# Patient Record
Sex: FEMALE | Race: WHITE | NOT HISPANIC OR LATINO | URBAN - METROPOLITAN AREA
[De-identification: names, ages, dates, MRNs, and addresses within clinical notes are randomized per-mention and may not be internally consistent; named-entity substitution may affect disease eponyms.]

---

## 2023-11-22 ENCOUNTER — OFFICE VISIT (OUTPATIENT)
Dept: URBAN - METROPOLITAN AREA CLINIC 54 | Facility: CLINIC | Age: 53
End: 2023-11-22

## 2023-11-22 RX ORDER — DULAGLUTIDE 1.5 MG/.5ML
AS DIRECTED INJECTION, SOLUTION SUBCUTANEOUS
Status: ACTIVE | COMMUNITY

## 2023-11-22 RX ORDER — DULOXETINE 60 MG/1
1 CAPSULE CAPSULE, DELAYED RELEASE PELLETS ORAL ONCE A DAY
Status: ACTIVE | COMMUNITY

## 2023-11-22 RX ORDER — BACLOFEN 10 MG/1
TAKE 1 TABLET (10 MG) BY ORAL ROUTE 4 TIMES PER DAY TABLET ORAL
Qty: 0 | Refills: 0 | Status: ACTIVE | COMMUNITY
Start: 1900-01-01

## 2023-11-22 RX ORDER — PREGABALIN 150 MG/1
1 CAPSULE CAPSULE ORAL ONCE A DAY
Status: ACTIVE | COMMUNITY

## 2023-11-22 RX ORDER — BUPROPION HYDROCHLORIDE 100 MG/1
TAKE 1 TABLET (100 MG) BY ORAL ROUTE 2 TIMES PER DAY TABLET, FILM COATED ORAL 2
Qty: 0 | Refills: 0 | Status: ACTIVE | COMMUNITY
Start: 1900-01-01

## 2023-11-22 RX ORDER — ST. JOHN'S WORT 300 MG
1 CAPSULE CAPSULE ORAL ONCE A DAY
Status: ACTIVE | COMMUNITY

## 2023-11-22 RX ORDER — ONDANSETRON 4 MG/1
1 TABLET ON THE TONGUE AND ALLOW TO DISSOLVE TABLET, ORALLY DISINTEGRATING ORAL ONCE A DAY
Status: ACTIVE | COMMUNITY

## 2023-11-22 RX ORDER — METFORMIN HYDROCHLORIDE 500 MG/1
1 TABLET WITH A MEAL TABLET, FILM COATED ORAL ONCE A DAY
Status: ACTIVE | COMMUNITY

## 2023-11-22 RX ORDER — HYDROCODONE BITARTRATE AND ACETAMINOPHEN 10; 325 MG/1; MG/1
1 TABLET AS NEEDED TABLET ORAL
Status: ACTIVE | COMMUNITY

## 2023-12-01 ENCOUNTER — LAB OUTSIDE AN ENCOUNTER (OUTPATIENT)
Dept: URBAN - METROPOLITAN AREA CLINIC 54 | Facility: CLINIC | Age: 53
End: 2023-12-01

## 2023-12-01 ENCOUNTER — OFFICE VISIT (OUTPATIENT)
Dept: URBAN - METROPOLITAN AREA CLINIC 54 | Facility: CLINIC | Age: 53
End: 2023-12-01
Payer: COMMERCIAL

## 2023-12-01 VITALS
BODY MASS INDEX: 27.83 KG/M2 | DIASTOLIC BLOOD PRESSURE: 68 MMHG | HEART RATE: 99 BPM | TEMPERATURE: 97.4 F | HEIGHT: 64 IN | SYSTOLIC BLOOD PRESSURE: 123 MMHG | WEIGHT: 163 LBS

## 2023-12-01 DIAGNOSIS — K50.813 CROHN'S DISEASE OF BOTH SMALL AND LARGE INTESTINE WITH FISTULA: ICD-10-CM

## 2023-12-01 DIAGNOSIS — Z93.2 ILEOSTOMY PRESENT: ICD-10-CM

## 2023-12-01 DIAGNOSIS — R14.0 ABDOMINAL BLOATING: ICD-10-CM

## 2023-12-01 DIAGNOSIS — R19.5 LOOSE STOOLS: ICD-10-CM

## 2023-12-01 DIAGNOSIS — R10.33 PERIUMBILICAL PAIN: ICD-10-CM

## 2023-12-01 PROBLEM — 71833008: Status: ACTIVE | Noted: 2023-12-01

## 2023-12-01 PROBLEM — 302111002: Status: ACTIVE | Noted: 2023-12-01

## 2023-12-01 PROCEDURE — 99204 OFFICE O/P NEW MOD 45 MIN: CPT

## 2023-12-01 RX ORDER — ATORVASTATIN CALCIUM 20 MG/1
1 TABLET TABLET, FILM COATED ORAL ONCE A DAY
Status: ACTIVE | COMMUNITY

## 2023-12-01 RX ORDER — PREGABALIN 150 MG/1
1 CAPSULE CAPSULE ORAL ONCE A DAY
Status: ACTIVE | COMMUNITY

## 2023-12-01 RX ORDER — METFORMIN HYDROCHLORIDE 500 MG/1
1 TABLET WITH A MEAL TABLET, FILM COATED ORAL ONCE A DAY
Status: ACTIVE | COMMUNITY

## 2023-12-01 RX ORDER — DULAGLUTIDE 1.5 MG/.5ML
AS DIRECTED INJECTION, SOLUTION SUBCUTANEOUS
Status: ACTIVE | COMMUNITY

## 2023-12-01 RX ORDER — HYDROCODONE BITARTRATE AND ACETAMINOPHEN 10; 325 MG/1; MG/1
1 TABLET AS NEEDED TABLET ORAL
Status: ACTIVE | COMMUNITY

## 2023-12-01 RX ORDER — BACLOFEN 10 MG/1
TAKE 1 TABLET (10 MG) BY ORAL ROUTE 4 TIMES PER DAY TABLET ORAL
Qty: 0 | Refills: 0 | Status: ACTIVE | COMMUNITY
Start: 1900-01-01

## 2023-12-01 RX ORDER — DICYCLOMINE HYDROCHLORIDE 20 MG/1
1 TABLET TABLET ORAL
Qty: 90 | Refills: 0 | OUTPATIENT
Start: 2023-12-01 | End: 2023-12-31

## 2023-12-01 RX ORDER — BUPROPION HYDROCHLORIDE 100 MG/1
TAKE 1 TABLET (100 MG) BY ORAL ROUTE 2 TIMES PER DAY TABLET, FILM COATED ORAL 2
Qty: 0 | Refills: 0 | Status: ACTIVE | COMMUNITY
Start: 1900-01-01

## 2023-12-01 RX ORDER — ST. JOHN'S WORT 300 MG
1 CAPSULE CAPSULE ORAL ONCE A DAY
Status: ACTIVE | COMMUNITY

## 2023-12-01 RX ORDER — CERTOLIZUMAB PEGOL 200 MG/ML
AS DIRECTED INJECTION, SOLUTION SUBCUTANEOUS
Status: ACTIVE | COMMUNITY

## 2023-12-01 RX ORDER — DULOXETINE 60 MG/1
1 CAPSULE CAPSULE, DELAYED RELEASE PELLETS ORAL ONCE A DAY
Status: ACTIVE | COMMUNITY

## 2023-12-01 NOTE — PHYSICAL EXAM GASTROINTESTINAL
Abdomen , soft, nontender, nondistended , no rebound guarding or rigidity , end ileostomy present, Liver and Spleen , no hepatomegaly present , no hepatosplenomegaly , liver nontender , spleen not palpable

## 2023-12-01 NOTE — HPI-TODAY'S VISIT:
12/1/23: Pt is a 51 yo female with a PMH of Crohn's disease previously seen by Dr. Lopez in 2016 and Dr. Jordan Lara ([facility]) in 2018, who presents to reestablish care with complaints of abdominal pain and bloating. Pt was initally diagnosed with UC at age 34, managed with Asacol, sulfasalazine, and steroids. Later found to have Crohn's involving the small bowel, as well as perianal fistulas. S/p total proctocolectomy with j-pouch followed by j-pouch construction x2 with ongoing complications; now has a perminant ileostomy. She has been on Humira, Stelara, and two months ago was switched to Cimzia by rheumatology (who is also managing AS). Today pt complains of periumbilical abdominal pain and bloating x 1 month. Pain is sharp to dull and related to bloating, which are both worse after eating. Denies any particular food triggers other than watermelon, but pt has cut back on carbs. Denies any change in stool output. No bloody, black, or tarry stools noted. Pt has lost 55 lbs intentionally through diet and exercise over the last year. Last ileoscopy was in 2016 which did not show any endoscopic or histologic evidence of active IBD. Nonobstructive KUB in 6/23. Most recent CMP was unremarkable in 9/23.

## 2023-12-05 LAB
A/G RATIO: 1.6
ADENOVIRUS F 40/41: NOT DETECTED
ALBUMIN: 4.5
ALKALINE PHOSPHATASE: 92
ALT (SGPT): 38
AST (SGOT): 40
ASTROVIRUS: NOT DETECTED
BASO (ABSOLUTE): 0.1
BASOS: 0
BILIRUBIN, TOTAL: 0.5
BUN/CREATININE RATIO: 41
BUN: 26
C DIFFICILE TOXIN A/B: NOT DETECTED
C-REACTIVE PROTEIN, QUANT: <1
CALCIUM: 9.6
CAMPYLOBACTER: NOT DETECTED
CARBON DIOXIDE, TOTAL: 23
CHLORIDE: 105
CREATININE: 0.64
CRYPTOSPORIDIUM: NOT DETECTED
CYCLOSPORA CAYETANENSIS: NOT DETECTED
E COLI O157: (no result)
EGFR: 107
ENTAMOEBA HISTOLYTICA: NOT DETECTED
ENTEROAGGREGATIVE E COLI: NOT DETECTED
ENTEROPATHOGENIC E COLI: NOT DETECTED
ENTEROTOXIGENIC E COLI: NOT DETECTED
EOS (ABSOLUTE): 0.2
EOS: 1
GIARDIA LAMBLIA: NOT DETECTED
GLOBULIN, TOTAL: 2.8
GLUCOSE: 100
HEMATOCRIT: 42.7
HEMATOLOGY COMMENTS:: (no result)
HEMOGLOBIN: 14.3
IMMATURE CELLS: (no result)
IMMATURE GRANS (ABS): 0
IMMATURE GRANULOCYTES: 0
LYMPHS (ABSOLUTE): 4.6
LYMPHS: 37
MCH: 31.2
MCHC: 33.5
MCV: 93
MONOCYTES(ABSOLUTE): 1
MONOCYTES: 8
NEUTROPHILS (ABSOLUTE): 6.6
NEUTROPHILS: 54
NOROVIRUS GI/GII: NOT DETECTED
NRBC: (no result)
PLATELETS: 331
PLESIOMONAS SHIGELLOIDES: NOT DETECTED
POTASSIUM: 4.8
PROTEIN, TOTAL: 7.3
RBC: 4.59
RDW: 13.1
ROTAVIRUS A: NOT DETECTED
SALMONELLA: NOT DETECTED
SAPOVIRUS: NOT DETECTED
SHIGA-TOXIN-PRODUCING E COLI: NOT DETECTED
SHIGELLA/ENTEROINVASIVE E COLI: NOT DETECTED
SODIUM: 141
VIBRIO CHOLERAE: NOT DETECTED
VIBRIO: NOT DETECTED
WBC: 12.4
YERSINIA ENTEROCOLITICA: NOT DETECTED

## 2023-12-28 LAB
CALPROTECTIN, FECAL: 82
REQUEST PROBLEM: (no result)

## 2024-01-12 ENCOUNTER — DASHBOARD ENCOUNTERS (OUTPATIENT)
Age: 54
End: 2024-01-12

## 2024-01-12 ENCOUNTER — OFFICE VISIT (OUTPATIENT)
Dept: URBAN - METROPOLITAN AREA CLINIC 54 | Facility: CLINIC | Age: 54
End: 2024-01-12
Payer: COMMERCIAL

## 2024-01-12 ENCOUNTER — TELEPHONE ENCOUNTER (OUTPATIENT)
Dept: URBAN - METROPOLITAN AREA CLINIC 54 | Facility: CLINIC | Age: 54
End: 2024-01-12

## 2024-01-12 VITALS
HEART RATE: 82 BPM | SYSTOLIC BLOOD PRESSURE: 117 MMHG | WEIGHT: 157 LBS | TEMPERATURE: 97.9 F | HEIGHT: 64 IN | DIASTOLIC BLOOD PRESSURE: 69 MMHG | BODY MASS INDEX: 26.8 KG/M2

## 2024-01-12 DIAGNOSIS — R14.0 ABDOMINAL BLOATING: ICD-10-CM

## 2024-01-12 DIAGNOSIS — R19.7 ACUTE DIARRHEA: ICD-10-CM

## 2024-01-12 DIAGNOSIS — K50.813 CROHN'S DISEASE OF BOTH SMALL AND LARGE INTESTINE WITH FISTULA: ICD-10-CM

## 2024-01-12 DIAGNOSIS — R10.33 PERIUMBILICAL PAIN: ICD-10-CM

## 2024-01-12 PROBLEM — 111583006: Status: ACTIVE | Noted: 2024-01-12

## 2024-01-12 PROCEDURE — 99213 OFFICE O/P EST LOW 20 MIN: CPT

## 2024-01-12 RX ORDER — ST. JOHN'S WORT 300 MG
1 CAPSULE CAPSULE ORAL ONCE A DAY
Status: ACTIVE | COMMUNITY

## 2024-01-12 RX ORDER — ATORVASTATIN CALCIUM 20 MG/1
1 TABLET TABLET, FILM COATED ORAL ONCE A DAY
Status: ACTIVE | COMMUNITY

## 2024-01-12 RX ORDER — CERTOLIZUMAB PEGOL 200 MG/ML
AS DIRECTED INJECTION, SOLUTION SUBCUTANEOUS
OUTPATIENT

## 2024-01-12 RX ORDER — DULOXETINE 60 MG/1
1 CAPSULE CAPSULE, DELAYED RELEASE PELLETS ORAL ONCE A DAY
Status: ACTIVE | COMMUNITY

## 2024-01-12 RX ORDER — DULAGLUTIDE 1.5 MG/.5ML
AS DIRECTED INJECTION, SOLUTION SUBCUTANEOUS
Status: ACTIVE | COMMUNITY

## 2024-01-12 RX ORDER — CERTOLIZUMAB PEGOL 200 MG/ML
AS DIRECTED INJECTION, SOLUTION SUBCUTANEOUS
Status: ACTIVE | COMMUNITY

## 2024-01-12 RX ORDER — BACLOFEN 10 MG/1
TAKE 1 TABLET (10 MG) BY ORAL ROUTE 4 TIMES PER DAY TABLET ORAL
Qty: 0 | Refills: 0 | Status: ACTIVE | COMMUNITY
Start: 1900-01-01

## 2024-01-12 RX ORDER — METFORMIN HYDROCHLORIDE 500 MG/1
1 TABLET WITH A MEAL TABLET, FILM COATED ORAL ONCE A DAY
Status: ACTIVE | COMMUNITY

## 2024-01-12 RX ORDER — BUPROPION HYDROCHLORIDE 100 MG/1
TAKE 1 TABLET (100 MG) BY ORAL ROUTE 2 TIMES PER DAY TABLET, FILM COATED ORAL 2
Qty: 0 | Refills: 0 | Status: ACTIVE | COMMUNITY
Start: 1900-01-01

## 2024-01-12 RX ORDER — PREGABALIN 150 MG/1
1 CAPSULE CAPSULE ORAL ONCE A DAY
Status: ACTIVE | COMMUNITY

## 2024-01-12 RX ORDER — HYDROCODONE BITARTRATE AND ACETAMINOPHEN 10; 325 MG/1; MG/1
1 TABLET AS NEEDED TABLET ORAL
Status: ACTIVE | COMMUNITY

## 2024-01-12 NOTE — HPI-TODAY'S VISIT:
12/1/23: Pt is a 51 yo female with a PMH of Crohn's disease previously seen by Dr. Lopez in 2016 and Dr. Jordan Lara ([facility]) in 2018, who presents to reestablish care with complaints of abdominal pain and bloating. Pt was initally diagnosed with UC at age 34, managed with Asacol, sulfasalazine, and steroids. Later found to have Crohn's involving the small bowel, as well as perianal fistulas. S/p total proctocolectomy with j-pouch followed by j-pouch construction x2 with ongoing complications; now has a perminant ileostomy. She has been on Humira, Stelara, and two months ago was switched to Cimzia by rheumatology (who is also managing AS). Today pt complains of periumbilical abdominal pain and bloating x 1 month. Pain is sharp to dull and related to bloating, which are both worse after eating. Denies any particular food triggers other than watermelon, but pt has cut back on carbs. Denies any change in stool output. No bloody, black, or tarry stools noted. Pt has lost 55 lbs intentionally through diet and exercise over the last year. Last ileoscopy was in 2016 which did not show any endoscopic or histologic evidence of active IBD. Nonobstructive KUB in 6/23. Most recent CMP was unremarkable in 9/23.  1/12/24: Pt presents for follow up of abd pain and bloating. Labs showed stable leukocytosis around 12 - pt has upcoming consult with heme for this. Slightly elevated but stable transaminases (AST 40, ALT 38). Normal CRP, borderline FC at 82. Negative infectious stool panel. MRE showed no evidence of active Crohn's. Abd pain and bloating have resolved with dietary changes following low FODMAP diet. Pt thinks she was eating too much watermelon. Initially had to take bentyl TID but not needing it at all anymore. Pt is concerned that her stoma is shrunk and is narrow. She was told that on last few ileoscopies. Still producing normal output. No severe cramping followed by explosive stool.

## 2024-01-12 NOTE — PHYSICAL EXAM GASTROINTESTINAL
Abdomen , soft, nontender, nondistended , no rebound guarding or rigidity , end ileostomy present with healthy appearing stoma, slightly retracted, Liver and Spleen , no hepatomegaly present , no hepatosplenomegaly , liver nontender , spleen not palpable

## 2024-07-12 ENCOUNTER — OFFICE VISIT (OUTPATIENT)
Dept: URBAN - METROPOLITAN AREA CLINIC 54 | Facility: CLINIC | Age: 54
End: 2024-07-12

## 2024-07-12 RX ORDER — CERTOLIZUMAB PEGOL 200 MG/ML
AS DIRECTED INJECTION, SOLUTION SUBCUTANEOUS
Status: ACTIVE | COMMUNITY

## 2024-07-12 RX ORDER — CERTOLIZUMAB PEGOL 200 MG/ML
AS DIRECTED INJECTION, SOLUTION SUBCUTANEOUS
OUTPATIENT

## 2024-07-12 RX ORDER — BACLOFEN 10 MG/1
TAKE 1 TABLET (10 MG) BY ORAL ROUTE 4 TIMES PER DAY TABLET ORAL
Qty: 0 | Refills: 0 | Status: ACTIVE | COMMUNITY
Start: 1900-01-01

## 2024-07-12 RX ORDER — DULAGLUTIDE 1.5 MG/.5ML
AS DIRECTED INJECTION, SOLUTION SUBCUTANEOUS
Status: ACTIVE | COMMUNITY

## 2024-07-12 RX ORDER — METHOCARBAMOL 750 MG/1
1 TABLET TABLET ORAL
Status: ACTIVE | COMMUNITY

## 2024-07-12 RX ORDER — DICYCLOMINE HYDROCHLORIDE 20 MG/1
1 TABLET TABLET ORAL
Qty: 90 | Refills: 3 | Status: ACTIVE | COMMUNITY
Start: 2023-12-01 | End: 2024-08-22

## 2024-07-12 RX ORDER — HYDROCODONE BITARTRATE AND ACETAMINOPHEN 10; 325 MG/1; MG/1
1 TABLET AS NEEDED TABLET ORAL
Status: ACTIVE | COMMUNITY

## 2024-07-12 RX ORDER — ONDANSETRON 4 MG/1
1 TABLET ON THE TONGUE AND ALLOW TO DISSOLVE TABLET, ORALLY DISINTEGRATING ORAL ONCE A DAY
Status: ACTIVE | COMMUNITY

## 2024-07-12 RX ORDER — PREGABALIN 150 MG/1
1 CAPSULE CAPSULE ORAL ONCE A DAY
Status: ACTIVE | COMMUNITY

## 2024-07-12 RX ORDER — DULOXETINE 60 MG/1
1 CAPSULE CAPSULE, DELAYED RELEASE PELLETS ORAL ONCE A DAY
Status: ACTIVE | COMMUNITY

## 2024-07-12 RX ORDER — BUPROPION HYDROCHLORIDE 100 MG/1
TAKE 1 TABLET (100 MG) BY ORAL ROUTE 2 TIMES PER DAY TABLET, FILM COATED ORAL 2
Qty: 0 | Refills: 0 | Status: ACTIVE | COMMUNITY
Start: 1900-01-01

## 2024-07-12 RX ORDER — METFORMIN HYDROCHLORIDE 500 MG/1
1 TABLET WITH A MEAL TABLET, FILM COATED ORAL ONCE A DAY
Status: ACTIVE | COMMUNITY

## 2024-07-12 RX ORDER — ST. JOHN'S WORT 300 MG
1 CAPSULE CAPSULE ORAL ONCE A DAY
Status: ACTIVE | COMMUNITY

## 2024-07-12 NOTE — HPI-TODAY'S VISIT:
12/1/23: Pt is a 53 yo female with a PMH of Crohn's disease previously seen by Dr. Lopez in 2016 and Dr. Jordan Lara ([facility]) in 2018, who presents to reestablish care with complaints of abdominal pain and bloating. Pt was initally diagnosed with UC at age 34, managed with Asacol, sulfasalazine, and steroids. Later found to have Crohn's involving the small bowel, as well as perianal fistulas. S/p total proctocolectomy with j-pouch followed by j-pouch construction x2 with ongoing complications; now has a perminant ileostomy. She has been on Humira, Stelara, and two months ago was switched to Cimzia by rheumatology (who is also managing AS). Today pt complains of periumbilical abdominal pain and bloating x 1 month. Pain is sharp to dull and related to bloating, which are both worse after eating. Denies any particular food triggers other than watermelon, but pt has cut back on carbs. Denies any change in stool output. No bloody, black, or tarry stools noted. Pt has lost 55 lbs intentionally through diet and exercise over the last year. Last ileoscopy was in 2016 which did not show any endoscopic or histologic evidence of active IBD. Nonobstructive KUB in 6/23. Most recent CMP was unremarkable in 9/23.  1/12/24: Pt presents for follow up of abd pain and bloating. Labs showed stable leukocytosis around 12 - pt has upcoming consult with heme for this. Slightly elevated but stable transaminases (AST 40, ALT 38). Normal CRP, borderline FC at 82. Negative infectious stool panel. MRE showed no evidence of active Crohn's. Abd pain and bloating have resolved with dietary changes following low FODMAP diet. Pt thinks she was eating too much watermelon. Initially had to take bentyl TID but not needing it at all anymore. Pt is concerned that her stoma is shrunk and is narrow. She was told that on last few ileoscopies. Still producing normal output. No severe cramping followed by explosive stool.

## 2024-07-26 ENCOUNTER — OFFICE VISIT (OUTPATIENT)
Dept: URBAN - METROPOLITAN AREA CLINIC 54 | Facility: CLINIC | Age: 54
End: 2024-07-26

## 2024-07-26 RX ORDER — HYDROCODONE BITARTRATE AND ACETAMINOPHEN 10; 325 MG/1; MG/1
1 TABLET AS NEEDED TABLET ORAL
Status: ACTIVE | COMMUNITY

## 2024-07-26 RX ORDER — METFORMIN HYDROCHLORIDE 500 MG/1
1 TABLET WITH A MEAL TABLET, FILM COATED ORAL ONCE A DAY
Status: ACTIVE | COMMUNITY

## 2024-07-26 RX ORDER — ONDANSETRON 4 MG/1
1 TABLET ON THE TONGUE AND ALLOW TO DISSOLVE TABLET, ORALLY DISINTEGRATING ORAL ONCE A DAY
Status: ACTIVE | COMMUNITY

## 2024-07-26 RX ORDER — METHOCARBAMOL 750 MG/1
1 TABLET TABLET ORAL
Status: ACTIVE | COMMUNITY

## 2024-07-26 RX ORDER — CERTOLIZUMAB PEGOL 200 MG/ML
AS DIRECTED INJECTION, SOLUTION SUBCUTANEOUS
Status: ACTIVE | COMMUNITY

## 2024-07-26 RX ORDER — BACLOFEN 10 MG/1
TAKE 1 TABLET (10 MG) BY ORAL ROUTE 4 TIMES PER DAY TABLET ORAL
Qty: 0 | Refills: 0 | Status: ACTIVE | COMMUNITY
Start: 1900-01-01

## 2024-07-26 RX ORDER — BUPROPION HYDROCHLORIDE 100 MG/1
TAKE 1 TABLET (100 MG) BY ORAL ROUTE 2 TIMES PER DAY TABLET, FILM COATED ORAL 2
Qty: 0 | Refills: 0 | Status: ACTIVE | COMMUNITY
Start: 1900-01-01

## 2024-07-26 RX ORDER — DULOXETINE 60 MG/1
1 CAPSULE CAPSULE, DELAYED RELEASE PELLETS ORAL ONCE A DAY
Status: ACTIVE | COMMUNITY

## 2024-07-26 RX ORDER — ST. JOHN'S WORT 300 MG
1 CAPSULE CAPSULE ORAL ONCE A DAY
Status: ACTIVE | COMMUNITY

## 2024-07-26 RX ORDER — DULAGLUTIDE 1.5 MG/.5ML
AS DIRECTED INJECTION, SOLUTION SUBCUTANEOUS
Status: ACTIVE | COMMUNITY

## 2024-07-26 RX ORDER — DICYCLOMINE HYDROCHLORIDE 20 MG/1
1 TABLET TABLET ORAL
Qty: 90 | Refills: 3 | Status: ACTIVE | COMMUNITY
Start: 2023-12-01 | End: 2024-08-22

## 2024-07-26 RX ORDER — PREGABALIN 150 MG/1
1 CAPSULE CAPSULE ORAL ONCE A DAY
Status: ACTIVE | COMMUNITY

## 2024-07-26 RX ORDER — ATORVASTATIN CALCIUM 20 MG/1
1 TABLET TABLET, FILM COATED ORAL ONCE A DAY
Status: ACTIVE | COMMUNITY

## 2025-03-11 ENCOUNTER — OFFICE VISIT (OUTPATIENT)
Dept: URBAN - METROPOLITAN AREA CLINIC 54 | Facility: CLINIC | Age: 55
End: 2025-03-11
Payer: COMMERCIAL

## 2025-03-11 VITALS
HEIGHT: 64 IN | DIASTOLIC BLOOD PRESSURE: 66 MMHG | SYSTOLIC BLOOD PRESSURE: 110 MMHG | BODY MASS INDEX: 28.9 KG/M2 | HEART RATE: 84 BPM | WEIGHT: 169.3 LBS | TEMPERATURE: 97.6 F

## 2025-03-11 DIAGNOSIS — R19.5 LOOSE STOOLS: ICD-10-CM

## 2025-03-11 DIAGNOSIS — D72.829 LEUKOCYTOSIS, UNSPECIFIED TYPE: ICD-10-CM

## 2025-03-11 DIAGNOSIS — Z93.2 ILEOSTOMY PRESENT: ICD-10-CM

## 2025-03-11 DIAGNOSIS — K50.813 CROHN'S DISEASE OF BOTH SMALL AND LARGE INTESTINE WITH FISTULA: ICD-10-CM

## 2025-03-11 DIAGNOSIS — R10.33 PERIUMBILICAL PAIN: ICD-10-CM

## 2025-03-11 DIAGNOSIS — R14.0 ABDOMINAL BLOATING: ICD-10-CM

## 2025-03-11 PROCEDURE — 99214 OFFICE O/P EST MOD 30 MIN: CPT | Performed by: PHYSICIAN ASSISTANT

## 2025-03-11 RX ORDER — ATORVASTATIN CALCIUM 20 MG/1
1 TABLET TABLET, FILM COATED ORAL ONCE A DAY
Status: ACTIVE | COMMUNITY

## 2025-03-11 RX ORDER — ST. JOHN'S WORT 300 MG
1 CAPSULE CAPSULE ORAL ONCE A DAY
Status: ACTIVE | COMMUNITY

## 2025-03-11 RX ORDER — DULOXETINE 60 MG/1
1 CAPSULE CAPSULE, DELAYED RELEASE PELLETS ORAL ONCE A DAY
Status: ACTIVE | COMMUNITY

## 2025-03-11 RX ORDER — CERTOLIZUMAB PEGOL 200 MG/ML
AS DIRECTED INJECTION, SOLUTION SUBCUTANEOUS
Status: ACTIVE | COMMUNITY

## 2025-03-11 RX ORDER — METFORMIN HYDROCHLORIDE 500 MG/1
1 TABLET WITH A MEAL TABLET, FILM COATED ORAL ONCE A DAY
Status: ACTIVE | COMMUNITY

## 2025-03-11 RX ORDER — BACLOFEN 10 MG/1
TAKE 1 TABLET (10 MG) BY ORAL ROUTE 4 TIMES PER DAY TABLET ORAL
Qty: 0 | Refills: 0 | Status: ACTIVE | COMMUNITY
Start: 1900-01-01

## 2025-03-11 RX ORDER — PREGABALIN 150 MG/1
1 CAPSULE CAPSULE ORAL ONCE A DAY
Status: ACTIVE | COMMUNITY

## 2025-03-11 RX ORDER — BUPROPION HYDROCHLORIDE 100 MG/1
TAKE 1 TABLET (100 MG) BY ORAL ROUTE 2 TIMES PER DAY TABLET, FILM COATED ORAL 2
Qty: 0 | Refills: 0 | Status: ACTIVE | COMMUNITY
Start: 1900-01-01

## 2025-03-11 RX ORDER — ONDANSETRON 4 MG/1
1 TABLET ON THE TONGUE AND ALLOW TO DISSOLVE TABLET, ORALLY DISINTEGRATING ORAL ONCE A DAY
Status: ACTIVE | COMMUNITY

## 2025-03-11 RX ORDER — HYDROCODONE BITARTRATE AND ACETAMINOPHEN 10; 325 MG/1; MG/1
1 TABLET AS NEEDED TABLET ORAL
Status: ACTIVE | COMMUNITY

## 2025-03-11 RX ORDER — DULAGLUTIDE 1.5 MG/.5ML
AS DIRECTED INJECTION, SOLUTION SUBCUTANEOUS
Status: ACTIVE | COMMUNITY

## 2025-03-11 RX ORDER — METHOCARBAMOL 750 MG/1
1 TABLET TABLET ORAL
Status: ACTIVE | COMMUNITY

## 2025-03-11 NOTE — PHYSICAL EXAM GASTROINTESTINAL
Abdomen , soft, nontender, nondistended , no guarding or rigidity , no masses palpable , stoma palpated over her ostomy bag w/o tenderness. no severe herniation. excessive abdominal fat.

## 2025-03-11 NOTE — HPI-TODAY'S VISIT:
12/1/23: Pt is a 51 yo female with a PMH of Crohn's disease previously seen by Dr. Lopez in 2016 and Dr. Jordan Laar ([facility]) in 2018, who presents to reestablish care with complaints of abdominal pain and bloating. Pt was initally diagnosed with UC at age 34, managed with Asacol, sulfasalazine, and steroids. Later found to have Crohn's involving the small bowel, as well as perianal fistulas. S/p total proctocolectomy with j-pouch followed by j-pouch construction x2 with ongoing complications; now has a perminant ileostomy. She has been on Humira, Stelara, and two months ago was switched to Cimzia by rheumatology (who is also managing AS). Today pt complains of periumbilical abdominal pain and bloating x 1 month. Pain is sharp to dull and related to bloating, which are both worse after eating. Denies any particular food triggers other than watermelon, but pt has cut back on carbs. Denies any change in stool output. No bloody, black, or tarry stools noted. Pt has lost 55 lbs intentionally through diet and exercise over the last year. Last ileoscopy was in 2016 which did not show any endoscopic or histologic evidence of active IBD. Nonobstructive KUB in 6/23. Most recent CMP was unremarkable in 9/23.  1/12/24: Pt presents for follow up of abd pain and bloating. Labs showed stable leukocytosis around 12 - pt has upcoming consult with heme for this. Slightly elevated but stable transaminases (AST 40, ALT 38). Normal CRP, borderline FC at 82. Negative infectious stool panel. MRE showed no evidence of active Crohn's. Abd pain and bloating have resolved with dietary changes following low FODMAP diet. Pt thinks she was eating too much watermelon. Initially had to take bentyl TID but not needing it at all anymore. Pt is concerned that her stoma is shrunk and is narrow. She was told that on last few ileoscopies. Still producing normal output. No severe cramping followed by explosive stool.  3/11/25: Patient presents with concern of Crohn's flare. Patient switched from Cimza to?Rinvoqu (unclear dosage) approx 5 months ago that seems to help with her arthritis and AS. Patient reports bloating and reducible herniation of her stoma. Pt continues to follow low FODMAP diet. She admits to drinking carbonated hahn. Recently she reports low grade fever of 100.4 - 100.8F detected last night and noted a fever blister to her mouth. Normal stool frequency and constinency. CT stone in 1/16/25 that showed bilateral renal calculi, CT A/P w/o in 11/2024with bilateral nephorlithiasis othwise no acute findings, and CT A/P w/ in 9/2024 that shows bilateral nephrolithiasis w/o acute changes. No signs of inflammation, bowel dilation, or obstruction on images. Patient reports occasional epistaxis and swallowing blood.

## 2025-03-12 LAB
A/G RATIO: 1.6
ABSOLUTE BASOPHILS: 42
ABSOLUTE EOSINOPHILS: 63
ABSOLUTE LYMPHOCYTES: 3024
ABSOLUTE MONOCYTES: 935
ABSOLUTE NEUTROPHILS: 6437
ALBUMIN: 4.1
ALKALINE PHOSPHATASE: 86
ALT (SGPT): 43
AST (SGOT): 39
BASOPHILS: 0.4
BILIRUBIN, TOTAL: 0.6
BUN/CREATININE RATIO: (no result)
BUN: 9
C-REACTIVE PROTEIN, QUANT: 34.3
CALCIUM: 9.2
CARBON DIOXIDE, TOTAL: 27
CHLORIDE: 99
CREATININE: 0.77
EGFR: 92
EOSINOPHILS: 0.6
GLOBULIN, TOTAL: 2.6
GLUCOSE: 106
HEMATOCRIT: 35
HEMOGLOBIN: 11.6
LYMPHOCYTES: 28.8
MCH: 32.4
MCHC: 33.1
MCV: 97.8
MONOCYTES: 8.9
MPV: 10.8
NEUTROPHILS: 61.3
PLATELET COUNT: 331
POTASSIUM: 4.4
PROTEIN, TOTAL: 6.7
RDW: 12.2
RED BLOOD CELL COUNT: 3.58
SODIUM: 136
WHITE BLOOD CELL COUNT: 10.5

## 2025-03-21 ENCOUNTER — TELEPHONE ENCOUNTER (OUTPATIENT)
Dept: URBAN - METROPOLITAN AREA CLINIC 54 | Facility: CLINIC | Age: 55
End: 2025-03-21

## 2025-04-04 ENCOUNTER — TELEPHONE ENCOUNTER (OUTPATIENT)
Dept: URBAN - METROPOLITAN AREA CLINIC 82 | Facility: CLINIC | Age: 55
End: 2025-04-04

## 2025-04-09 ENCOUNTER — OFFICE VISIT (OUTPATIENT)
Dept: URBAN - METROPOLITAN AREA CLINIC 54 | Facility: CLINIC | Age: 55
End: 2025-04-09

## 2025-04-15 ENCOUNTER — OFFICE VISIT (OUTPATIENT)
Dept: URBAN - METROPOLITAN AREA CLINIC 54 | Facility: CLINIC | Age: 55
End: 2025-04-15
Payer: COMMERCIAL

## 2025-04-15 DIAGNOSIS — R19.5 LOOSE STOOLS: ICD-10-CM

## 2025-04-15 DIAGNOSIS — R14.0 ABDOMINAL BLOATING: ICD-10-CM

## 2025-04-15 DIAGNOSIS — M45.9 ANKYLOSING SPONDYLITIS, UNSPECIFIED SITE OF SPINE: ICD-10-CM

## 2025-04-15 DIAGNOSIS — M05.20 RHEUMATOID ARTERITIS: ICD-10-CM

## 2025-04-15 DIAGNOSIS — K50.813 CROHN'S DISEASE OF BOTH SMALL AND LARGE INTESTINE WITH FISTULA: ICD-10-CM

## 2025-04-15 DIAGNOSIS — D72.829 LEUKOCYTOSIS, UNSPECIFIED TYPE: ICD-10-CM

## 2025-04-15 DIAGNOSIS — Z93.2 ILEOSTOMY PRESENT: ICD-10-CM

## 2025-04-15 DIAGNOSIS — R10.33 PERIUMBILICAL PAIN: ICD-10-CM

## 2025-04-15 PROBLEM — 399923009: Status: ACTIVE | Noted: 2025-04-15

## 2025-04-15 PROBLEM — 9631008: Status: ACTIVE | Noted: 2025-04-15

## 2025-04-15 PROCEDURE — 99213 OFFICE O/P EST LOW 20 MIN: CPT | Performed by: PHYSICIAN ASSISTANT

## 2025-04-15 RX ORDER — BUPROPION HYDROCHLORIDE 100 MG/1
TAKE 1 TABLET (100 MG) BY ORAL ROUTE 2 TIMES PER DAY TABLET, FILM COATED ORAL 2
Qty: 0 | Refills: 0 | Status: ACTIVE | COMMUNITY
Start: 1900-01-01

## 2025-04-15 RX ORDER — ATORVASTATIN CALCIUM 20 MG/1
1 TABLET TABLET, FILM COATED ORAL ONCE A DAY
Status: ACTIVE | COMMUNITY

## 2025-04-15 RX ORDER — UPADACITINIB 15 MG/1
1 TABLET TABLET, EXTENDED RELEASE ORAL ONCE A DAY
Status: ACTIVE | COMMUNITY

## 2025-04-15 RX ORDER — ST. JOHN'S WORT 300 MG
1 CAPSULE CAPSULE ORAL ONCE A DAY
Status: ACTIVE | COMMUNITY

## 2025-04-15 RX ORDER — DULAGLUTIDE 1.5 MG/.5ML
AS DIRECTED INJECTION, SOLUTION SUBCUTANEOUS
Status: ACTIVE | COMMUNITY

## 2025-04-15 RX ORDER — PREGABALIN 200 MG/1
2 CAPSULE CAPSULE ORAL ONCE A DAY
Status: ACTIVE | COMMUNITY

## 2025-04-15 RX ORDER — METHOCARBAMOL 750 MG/1
1.5 TABLET TABLET ORAL
Status: ON HOLD | COMMUNITY

## 2025-04-15 RX ORDER — BACLOFEN 10 MG/1
TAKE 1 TABLET (10 MG) BY ORAL ROUTE 4 TIMES PER DAY TABLET ORAL
Qty: 0 | Refills: 0 | Status: ACTIVE | COMMUNITY
Start: 1900-01-01

## 2025-04-15 RX ORDER — METFORMIN HYDROCHLORIDE 500 MG/1
1 TABLET WITH A MEAL TABLET, FILM COATED ORAL ONCE A DAY
Status: ON HOLD | COMMUNITY

## 2025-04-15 RX ORDER — CERTOLIZUMAB PEGOL 200 MG/ML
AS DIRECTED INJECTION, SOLUTION SUBCUTANEOUS
Status: ON HOLD | COMMUNITY

## 2025-04-15 RX ORDER — DULOXETINE 60 MG/1
1 CAPSULE CAPSULE, DELAYED RELEASE PELLETS ORAL ONCE A DAY
Status: ACTIVE | COMMUNITY

## 2025-04-15 RX ORDER — HYDROCODONE BITARTRATE AND ACETAMINOPHEN 10; 325 MG/1; MG/1
1 TABLET AS NEEDED TABLET ORAL
Status: ACTIVE | COMMUNITY

## 2025-04-15 RX ORDER — ONDANSETRON 4 MG/1
1 TABLET ON THE TONGUE AND ALLOW TO DISSOLVE TABLET, ORALLY DISINTEGRATING ORAL ONCE A DAY
Status: ON HOLD | COMMUNITY

## 2025-04-15 NOTE — HPI-TODAY'S VISIT:
12/1/23: Pt is a 51 yo female with a PMH of Crohn's disease previously seen by Dr. Lopez in 2016 and Dr. Jordan Lara ([facility]) in 2018, who presents to reestablish care with complaints of abdominal pain and bloating. Pt was initally diagnosed with UC at age 34, managed with Asacol, sulfasalazine, and steroids. Later found to have Crohn's involving the small bowel, as well as perianal fistulas. S/p total proctocolectomy with j-pouch followed by j-pouch construction x2 with ongoing complications; now has a perminant ileostomy. She has been on Humira, Stelara, and two months ago was switched to Cimzia by rheumatology (who is also managing AS). Today pt complains of periumbilical abdominal pain and bloating x 1 month. Pain is sharp to dull and related to bloating, which are both worse after eating. Denies any particular food triggers other than watermelon, but pt has cut back on carbs. Denies any change in stool output. No bloody, black, or tarry stools noted. Pt has lost 55 lbs intentionally through diet and exercise over the last year. Last ileoscopy was in 2016 which did not show any endoscopic or histologic evidence of active IBD. Nonobstructive KUB in 6/23. Most recent CMP was unremarkable in 9/23.  1/12/24: Pt presents for follow up of abd pain and bloating. Labs showed stable leukocytosis around 12 - pt has upcoming consult with heme for this. Slightly elevated but stable transaminases (AST 40, ALT 38). Normal CRP, borderline FC at 82. Negative infectious stool panel. MRE showed no evidence of active Crohn's. Abd pain and bloating have resolved with dietary changes following low FODMAP diet. Pt thinks she was eating too much watermelon. Initially had to take bentyl TID but not needing it at all anymore. Pt is concerned that her stoma is shrunk and is narrow. She was told that on last few ileoscopies. Still producing normal output. No severe cramping followed by explosive stool.  3/11/25: Patient presents with concern of Crohn's flare. Patient switched from Cimza to?Rinvoqu (unclear dosage) approx 5 months ago that seems to help with her arthritis and AS. Patient reports bloating and reducible herniation of her stoma. Pt continues to follow low FODMAP diet. She admits to drinking carbonated hahn. Recently she reports low grade fever of 100.4 - 100.8F detected last night and noted a fever blister to her mouth. Normal stool frequency and constinency. CT stone in 1/16/25 that showed bilateral renal calculi, CT A/P w/o in 11/2024with bilateral nephorlithiasis othwise no acute findings, and CT A/P w/ in 9/2024 that shows bilateral nephrolithiasis w/o acute changes. No signs of inflammation, bowel dilation, or obstruction on images. Patient reports occasional epistaxis and swallowing blood.  4/15/25: Patient states she was instructed to return to our clinic for iron deficiency.  Outside labs reviewed that showed low iron and iron sat with normal ferritin at 145.  Normal hemoglobin.  She reports low iron previously requiring IV iron infusion.  No current diarrhea.  Patient remains on Rinvoq 15 mg daily.  Overall doing well.

## 2025-04-15 NOTE — PHYSICAL EXAM GASTROINTESTINAL
Abdomen , nondistended ,  stoma palpated over her ostomy bag w/o tenderness. no severe herniation. excessive abdominal fat.

## 2025-05-13 ENCOUNTER — OFFICE VISIT (OUTPATIENT)
Dept: URBAN - METROPOLITAN AREA CLINIC 54 | Facility: CLINIC | Age: 55
End: 2025-05-13

## 2025-07-21 ENCOUNTER — TELEPHONE ENCOUNTER (OUTPATIENT)
Dept: URBAN - METROPOLITAN AREA CLINIC 54 | Facility: CLINIC | Age: 55
End: 2025-07-21